# Patient Record
Sex: MALE | Race: WHITE | NOT HISPANIC OR LATINO | Employment: FULL TIME | URBAN - METROPOLITAN AREA
[De-identification: names, ages, dates, MRNs, and addresses within clinical notes are randomized per-mention and may not be internally consistent; named-entity substitution may affect disease eponyms.]

---

## 2017-10-11 DIAGNOSIS — L71.9 ACNE ROSACEA: ICD-10-CM

## 2017-10-11 DIAGNOSIS — L70.0 ACNE VULGARIS: ICD-10-CM

## 2017-10-11 RX ORDER — SULFACETAMIDE SODIUM, SULFUR 100; 50 MG/G; MG/G
LOTION TOPICAL
Qty: 3 BOTTLE | Refills: 4 | Status: SHIPPED | OUTPATIENT
Start: 2017-10-11 | End: 2018-06-29

## 2017-10-11 RX ORDER — CEPHALEXIN 500 MG/1
500 CAPSULE ORAL 2 TIMES DAILY
Qty: 180 CAPSULE | Refills: 1 | Status: SHIPPED | OUTPATIENT
Start: 2017-10-11 | End: 2018-08-03

## 2017-10-11 NOTE — TELEPHONE ENCOUNTER
Last seen 10/21/15: Future appointment scheduled for 12/7/17  1. Acne Rosacea with sebaceous hyperplasia, with a history of nodular cystic acne and scarring.  No active cysts today.   He would like to continue his current treatment.  -continue cephalexin to 500 mg 1-2 times daily. Try to use just once daily and if increased breakouts, increase.   -Continue  sulfacetamide sodium sulfur 10-5% emulsion lotion bid and a gentle cleanser.         All risks, benefits and alternatives were discussed with patient.  Patient is in agreement and understands the assessment and plan.  All questions were answered.  Sun Screen Education was given.   Return to Clinic annually or sooner as needed.   Emperatriz Hayes PA-C

## 2017-12-07 ENCOUNTER — OFFICE VISIT (OUTPATIENT)
Dept: DERMATOLOGY | Facility: CLINIC | Age: 57
End: 2017-12-07

## 2017-12-07 DIAGNOSIS — L71.9 ACNE ROSACEA: Primary | ICD-10-CM

## 2017-12-07 DIAGNOSIS — L73.8 SEBACEOUS HYPERPLASIA OF FACE: ICD-10-CM

## 2017-12-07 DIAGNOSIS — L82.1 SEBORRHEIC KERATOSIS: ICD-10-CM

## 2017-12-07 ASSESSMENT — PAIN SCALES - GENERAL: PAINLEVEL: NO PAIN (0)

## 2017-12-07 NOTE — PROGRESS NOTES
[facility]  December 7, 2017      DERMATOLOGY RETURN VISIT    Problem list:   Acne rosacea-   has used metrogel without improvement and did not like tretinoin. Dapsone gel not covered.   Previously used minocycline, doxycyline.  Hx of cystic acne, treated with Accutane.      CHIEF COMPLAINT:  Rosacea, acne.      HISTORY OF PRESENT ILLNESS:  Mr. Glass is a very pleasant, 57-year-old  male who returns for followup of his severe nodular cystic acne with scarring and persistent rosacea. He was last seen October 21, 2015. He was continued on cephalexin and   sulfacetamide sodium sulfur 10-5% emulsion lotion. He states this regiment works very well. He was running low on his lotion and started using an older prescription of Azelex. He does not feel this works as well. His skin is worse when he tapers down on the cephalexin.  He avoids using this if he is outdoors.He denies any significant breakouts and is happy with how his skin looks. He denies any skin eruptions, abdominal pain, stool changes, fevers or arthralgias. He's feeling well otherwise, no other skin complaints.      Family hx : No family hx of skin cancer.  The patient denies GI upset, bloating, diarrhea, new rashes. Denies changing, growing, painful or bleeding lesions.     EXAM:   GENERAL:    Mr. Glass is a well-groomed adult  male who presents in no acute distress.  He is alert and oriented x3.  He is pleasant and cooperative with the exam.  Dupont skin type II.     SKIN: Waist-up skin, which includes the head/face, neck, both arms, chest, back, abdomen, digits and/or nails was examined. Significant for:      There is residual post acne cystic scarring on the face, bilateral cheeks, chin and forehead, some ice pick scarring.  Multiple lesions are consistent with sebaceous hyperplasia distributed widely across the face, most prominent in mid face region.    There is mild bulbous enlargement of his nose and  mild malar thickening noted on bilateral cheeks.    There is no evidence of blepharitis, conjunctivitis or other periorbital involvement.   No cystic papules noted. Rare open comedones on the face.  There is mild residual flushing noted on bilateral cheeks as well.  The chest and back are clear of acne.  Waxy white, brown and tan verrucoid, stuck on- appearing papules and plaques to the trunk and upper extremities.    ASSESSMENT AND PLAN:    1. Acne Rosacea with sebaceous hyperplasia, with a history of nodular cystic acne and scarring.  No active cysts today.   He would like to continue his current treatment.  -continue cephalexin to 500 mg 1-2 times daily. Try to use just once daily and if increased breakouts, increase.   -Continue  sulfacetamide sodium sulfur 10-5% emulsion lotion bid and a gentle cleanser.     2. Waist up skin exam with benign findings including seborrheic keratoses. Reassurance given.      All risks, benefits and alternatives were discussed with patient.  Patient is in agreement and understands the assessment and plan.  All questions were answered.  Sun Screen Education was given.   Return to Clinic 1-2 yrs or sooner as needed.   Emperatriz Hayes PA-C

## 2017-12-07 NOTE — LETTER
12/7/2017       RE: Nathen Glass  [address]  [place] [address]     Dear Colleague,    Thank you for referring your patient, Nathen Glass, to the [facility] DERMATOLOGY at [facility]. Please see a copy of my visit note below.    [facility]  December 7, 2017      DERMATOLOGY RETURN VISIT    Problem list:   Acne rosacea-   has used metrogel without improvement and did not like tretinoin. Dapsone gel not covered.   Previously used minocycline, doxycyline.  Hx of cystic acne, treated with Accutane.      CHIEF COMPLAINT:  Rosacea, acne.      HISTORY OF PRESENT ILLNESS:  Mr. Glass is a very pleasant, 57-year-old  male who returns for followup of his severe nodular cystic acne with scarring and persistent rosacea. He was last seen October 21, 2015. He was continued on cephalexin and   sulfacetamide sodium sulfur 10-5% emulsion lotion. He states this regiment works very well. He was running low on his lotion and started using an older prescription of Azelex. He does not feel this works as well. His skin is worse when he tapers down on the cephalexin.  He avoids using this if he is outdoors.He denies any significant breakouts and is happy with how his skin looks. He denies any skin eruptions, abdominal pain, stool changes, fevers or arthralgias. He's feeling well otherwise, no other skin complaints.      Family hx : No family hx of skin cancer.  The patient denies GI upset, bloating, diarrhea, new rashes. Denies changing, growing, painful or bleeding lesions.     EXAM:   GENERAL:    Mr. Glass is a well-groomed adult  male who presents in no acute distress.  He is alert and oriented x3.  He is pleasant and cooperative with the exam.  Dupont skin type II.     SKIN: Waist-up skin, which includes the head/face, neck, both arms, chest, back, abdomen, digits and/or nails was examined. Significant for:      There is residual  post acne cystic scarring on the face, bilateral cheeks, chin and forehead, some ice pick scarring.  Multiple lesions are consistent with sebaceous hyperplasia distributed widely across the face, most prominent in mid face region.    There is mild bulbous enlargement of his nose and mild malar thickening noted on bilateral cheeks.    There is no evidence of blepharitis, conjunctivitis or other periorbital involvement.   No cystic papules noted. Rare open comedones on the face.  There is mild residual flushing noted on bilateral cheeks as well.  The chest and back are clear of acne.  Waxy white, brown and tan verrucoid, stuck on- appearing papules and plaques to the trunk and upper extremities.    ASSESSMENT AND PLAN:    1. Acne Rosacea with sebaceous hyperplasia, with a history of nodular cystic acne and scarring.  No active cysts today.   He would like to continue his current treatment.  -continue cephalexin to 500 mg 1-2 times daily. Try to use just once daily and if increased breakouts, increase.   -Continue  sulfacetamide sodium sulfur 10-5% emulsion lotion bid and a gentle cleanser.     2. Waist up skin exam with benign findings including seborrheic keratoses. Reassurance given.      All risks, benefits and alternatives were discussed with patient.  Patient is in agreement and understands the assessment and plan.  All questions were answered.  Sun Screen Education was given.   Return to Clinic 1-2 yrs or sooner as needed.   Emperatriz Hayes PA-C     Again, thank you for allowing me to participate in the care of your patient.      Sincerely,    Emperatriz Hayes PA-C

## 2017-12-07 NOTE — LETTER
Date:December 8, 2017      Patient was self referred, no letter generated. Do not send.        [facility] Health Information

## 2017-12-07 NOTE — MR AVS SNAPSHOT
After Visit Summary   2017    Nathen Glass    MRN: [id number]           Patient Information     Date Of Birth          1960        Visit Information        Provider Department      2017 7:45 AM Emperatriz Hayes PA-C [facility]        Today's Diagnoses     Acne rosacea    -  1    Seborrheic keratosis        Sebaceous hyperplasia of face           Follow-ups after your visit        Follow-up notes from your care team     Return in about 1 year (around 2018).      Who to contact     Please call your clinic at [phone] to:    Ask questions about your health    Make or cancel appointments    Discuss your medicines    Learn about your test results    Speak to your doctor   If you have compliments or concerns about an experience at your clinic, or if you wish to file a complaint, please contact [facility] Patient Relations at [phone] or email us at [email or web address]         Additional Information About Your Visit        MyChart Information     [employer] is an electronic gateway that provides easy, online access to your medical records. With [employer], you can request a clinic appointment, read your test results, renew a prescription or communicate with your care team.     To sign up for [employer] visit the website at [email or web address]   You will be asked to enter the access code listed below, as well as some personal information. Please follow the directions to create your username and password.     Your access code is: [email or web address]  Expires: 2018  6:30 AM     Your access code will  in 90 days. If you need help or a new code, please contact your [facility] or call [phone] for assistance.        Care EveryWhere ID     This is your Care EveryWhere ID. This could be used by other organizations to access your [facility] medical records  [id number]         Blood Pressure from  Last 3 Encounters:   No data found for BP    Weight from Last 3 Encounters:   No data found for Wt              Today, you had the following     No orders found for display       Primary Care Provider    None Specified       No primary provider on file.        Equal Access to Services     GONZALESLILIAN JEREMY : Haddeana aad ku hadconcepción Hansen, christiano ortiz, macho kakendal dubois, filippo gonzalez laRichardjeramie . So [facility] [phone].    ATENCIÓN: Si habla español, tiene a martines disposición servicios gratuitos de asistencia lingüística. Llame al [phone].    We comply with applicable federal civil rights laws and [place] laws. We do not discriminate on the basis of race, color, national origin, age, disability, sex, sexual orientation, or gender identity.            Thank you!     Thank you for choosing [facility] DERMAT[facility]  for your care. Our goal is always to provide you with excellent care. Hearing back from our patients is one way we can continue to improve our services. Please take a few minutes to complete the written survey that you may receive in the mail after your visit with us. Thank you!             Your Updated Medication List - Protect others around you: Learn how to safely use, store and throw away your medicines at www.disposemymeds.org.          This list is accurate as of: 12/7/17  9:16 AM.  Always use your most recent med list.                   Brand Name Dispense Instructions for use Diagnosis    cephALEXin 500 MG capsule    KEFLEX    180 capsule    Take 1 capsule (500 mg) by mouth 2 times daily    Acne vulgaris       lisinopril 40 MG tablet    PRINIVIL/ZESTRIL     Take 40 mg by mouth daily.        sulfacetamide sodium-sulfur 10-5 % Lotn     3 Bottle    Use daily 1-2 times daily to face. (3 month supply)    Acne rosacea       ZOCOR PO      Take 1 tablet by mouth daily.

## 2017-12-07 NOTE — NURSING NOTE
Dermatology Rooming Note    Nathen Glass's goals for this visit include:   Chief Complaint   Patient presents with     Derm Problem     Nathen is here for a rosacea and acne follow up.  States he's had improvement.  States no concerns.       Renata Houston LPN

## 2018-06-28 ENCOUNTER — TELEPHONE (OUTPATIENT)
Dept: DERMATOLOGY | Facility: CLINIC | Age: 58
End: 2018-06-28

## 2018-06-28 DIAGNOSIS — L71.9 ACNE ROSACEA: Primary | ICD-10-CM

## 2018-06-28 NOTE — TELEPHONE ENCOUNTER
New insurance will only cover Rx foam sulfacetamide sodium-sulfur 10-5% 90 day supply.  Can you please order foam.     Pharmacy selected.

## 2018-06-28 NOTE — TELEPHONE ENCOUNTER
[facility] Call Center    Phone Message    May a detailed message be left on voicemail: no    Reason for Call: Other: Patient changed to new insurance and pharmacy. He using [facility]. Patient can  no longer use the lotion, but can have the foam for RX sulfacetamide sodium-sulfur 10-5 % LOTN. Patient would like a call to discuss care plan.      Action Taken: Message routed to:  [facility] derm

## 2018-08-03 DIAGNOSIS — L70.0 ACNE VULGARIS: ICD-10-CM

## 2018-08-04 RX ORDER — CEPHALEXIN 500 MG/1
500 CAPSULE ORAL 2 TIMES DAILY
Qty: 180 CAPSULE | Refills: 1 | Status: SHIPPED | OUTPATIENT
Start: 2018-08-04 | End: 2019-05-22

## 2018-08-04 NOTE — TELEPHONE ENCOUNTER
cephALEXin (KEFLEX) 500 MG capsule     Last Written Prescription Date:  10/11/17  Last Fill Quantity: 180,   # refills: 1  Last Office Visit :12/7/17  Future Office visit: none    continue cephalexin to 500 mg 1-2 times daily. Try to use just once daily and if increased breakouts, increase.   Return to Clinic 1-2 yrs or sooner as needed    Refilled #180 1 rf to pharmacy

## 2019-05-22 DIAGNOSIS — L70.0 ACNE VULGARIS: ICD-10-CM

## 2019-05-28 RX ORDER — CEPHALEXIN 500 MG/1
500 CAPSULE ORAL 2 TIMES DAILY
Qty: 180 CAPSULE | Refills: 1 | Status: SHIPPED | OUTPATIENT
Start: 2019-05-28 | End: 2020-01-08

## 2019-05-28 NOTE — TELEPHONE ENCOUNTER
"  cephALEXin (KEFLEX) 500 MG capsule    Last Written Prescription Date:  8/4/18  Last Fill Quantity: 180,   # refills: 1  Last Office Visit : 12/7/17  Future Office visit:  None    Process 4    12/7/17  Freddy  \"  continue cephalexin to 500 mg 1-2 times daily. Try to use just once daily and if increased breakouts, increase.'     \"Return to Clinic 1-2 yrs or sooner as needed.         "

## 2019-06-07 DIAGNOSIS — L71.9 ACNE ROSACEA: ICD-10-CM

## 2020-01-04 DIAGNOSIS — L70.0 ACNE VULGARIS: ICD-10-CM

## 2020-01-08 RX ORDER — CEPHALEXIN 500 MG/1
500 CAPSULE ORAL 2 TIMES DAILY
Qty: 180 CAPSULE | Refills: 0 | Status: SHIPPED | OUTPATIENT
Start: 2020-01-08 | End: 2020-02-24

## 2020-01-08 NOTE — TELEPHONE ENCOUNTER
"Last Clinic Visit: 12-7-17:  \"Return to Clinic 1-2 yrs or sooner as needed.\"    Scheduling has been notified to contact the pt for appointment.    "

## 2020-01-15 ENCOUNTER — DOCUMENTATION ONLY (OUTPATIENT)
Dept: CARE COORDINATION | Facility: CLINIC | Age: 60
End: 2020-01-15

## 2020-02-24 ENCOUNTER — OFFICE VISIT (OUTPATIENT)
Dept: DERMATOLOGY | Facility: CLINIC | Age: 60
End: 2020-02-24
Payer: COMMERCIAL

## 2020-02-24 DIAGNOSIS — L71.9 ACNE ROSACEA: Primary | ICD-10-CM

## 2020-02-24 DIAGNOSIS — L21.9 DERMATITIS, SEBORRHEIC: ICD-10-CM

## 2020-02-24 DIAGNOSIS — L70.0 ACNE VULGARIS: ICD-10-CM

## 2020-02-24 RX ORDER — CEPHALEXIN 500 MG/1
500 CAPSULE ORAL 2 TIMES DAILY
Qty: 180 CAPSULE | Refills: 3 | Status: SHIPPED | OUTPATIENT
Start: 2020-02-24 | End: 2021-11-18

## 2020-02-24 RX ORDER — SULFACETAMIDE SODIUM 100 MG/ML
LOTION TOPICAL
Qty: 3 BOTTLE | Refills: 3 | Status: SHIPPED | OUTPATIENT
Start: 2020-02-24 | End: 2021-12-21

## 2020-02-24 RX ORDER — KETOCONAZOLE 20 MG/G
CREAM TOPICAL DAILY
Qty: 60 G | Refills: 3 | Status: SHIPPED | OUTPATIENT
Start: 2020-02-24 | End: 2021-12-21

## 2020-02-24 NOTE — NURSING NOTE
Chief Complaint   Patient presents with     Derm Problem     Nathen is here today for Rosacaea follow up appt.      Guerda Hammond LPN

## 2020-02-24 NOTE — PROGRESS NOTES
[facility] Dermatology Note      Dermatology Problem List:  1. Acne Rosacea  - s/p sulfacetamide sodium sulfur 10-5% emulsion lotion BID  - cephalexin to 500 mg 1-2 times daily  - sulfacetamide sodium 10% lotion BID  - Gentle cleanser daily  2. Seborrheic dermatitis  - Ketoconazole 2% cream BID    Encounter Date: Feb 24, 2020    CC:  Chief Complaint   Patient presents with     Derm Problem     Nathen is here today for Rosacaea follow up appt.          History of Present Illness:  Mr. Nathen Glass is a 59 year old male who presents as a follow-up for acne rosacea. The patient was last seen 12/7/17 when he continued use of cephalexin 500 mg 1-2 times a day, sulfacetamide sodium sulfur 10-5% emulsion lotion BID, and use of a gentle cleanser for his acne rosacea.    His skin has been stable since the last visit. He has not been using the sulfacetamide sodium sulfur lotion as his insurance discontinued coverage. He has continued Cephalexin 500 mg. Most days he takes it once a day, but for flares he uses it twice a day. He has taken it daily and has not decreased to every other day or attempted a taper.     He notes that he has several flaky lesions on the face and neck today today.     The patient denies GI upset, bloating, diarrhea, new rashes. Denies changing, growing, painful or bleeding lesions. Otherwise he is feeling well, without additional skin concerns at this time.      Past Medical History:   Patient Active Problem List   Diagnosis     Acne rosacea     No past medical history on file.  No past surgical history on file.    Social History:   reports that he has quit smoking. His smoking use included cigarettes. He has never used smokeless tobacco.    Family History:  No family hx of skin cancer.  Family History   Problem Relation Age of Onset     Cancer No family hx of         No family history of skin cancer       Medications:  Current Outpatient Medications   Medication Sig Dispense Refill      cephALEXin (KEFLEX) 500 MG capsule Take 1 capsule (500 mg) by mouth 2 times daily Call clinic to schedule follow up appointment.Needed for further rerfills 180 capsule 0     lisinopril (PRINIVIL,ZESTRIL) 40 MG tablet Take 40 mg by mouth daily.       Simvastatin (ZOCOR PO) Take 1 tablet by mouth daily.       Sulfacetamide Sodium-Sulfur 10-5 % FOAM Apply 1-2 times daily to the face. 3 each 3       Allergies   Allergen Reactions     Gemfibrozil Unknown     Unknown         Review of Systems:  -As per HPI  -Constitutional: The patient denies fatigue, fevers, chills, unintended weight loss, and night sweats.  -HEENT: Patient denies nonhealing oral sores.  -Skin: As above in HPI. No additional skin concerns.    Physical exam:  Vitals: There were no vitals taken for this visit.  GEN: This is a well developed, well-nourished male in no acute distress, in a pleasant mood.    SKIN: Focused examination of the Face, neck, and hands was performed.    - Residual post acne cystic scarring on the face, bilateral cheeks, chin and forehead, some ice pick scarring.  Multiple lesions are consistent with sebaceous hyperplasia distributed widely across the face, most prominent in mid face region.    - No evidence of blepharitis, conjunctivitis or other periorbital involvement.  - Two pustules and erythema to the left lobule  - Pink scaly plaque to the left nasolabial fold and upper neck bilaterally.  - Resolving papule on the left upper neck  - No other lesions of concern on areas examined.       Impression/Plan:  1. Acne Rosacea   - Discussed initiating another lotion that may be covered by his insurance as he has been unable to use the sulfur based lotion.  - Start sulfacetamide sodium 10% lotion, apply BID to the face  - Continue Cephalexin 500 mg 1-2 times a day, taking BID for flares or worsening symptoms    2. Seborrheic dermatitis, left nasolabial fold and upper neck bilaterally  - Start Ketoconazole 2% cream BID to flaking on  the face and behind the ears      Follow-up in 1 year, earlier for new or changing lesions.       Staff Involved:  Staff/Scribe    Scribe Disclosure:  I, Steve Alvarado am serving as a scribe to document services personally performed by Emperatriz Hayes PA-C based on data collection and the provider's statements to me.     Provider Disclosure:   The documentation recorded by the scribe accurately reflects the services I personally performed and the decisions made by me.    All risks, benefits and alternatives were discussed with patient.  Patient is in agreement and understands the assessment and plan.  All questions were answered.  Sun Screen Education was given.   Return to Clinic annually or sooner as needed.   Emperatriz Hayes PA-C   [facility]

## 2020-02-24 NOTE — LETTER
Date:February 25, 2020      Patient was self referred, no letter generated. Do not send.        [facility] Health Information

## 2020-02-24 NOTE — LETTER
2/24/2020       RE: Nathen Glass  [address]  [place] [address]     Dear Colleague,    Thank you for referring your patient, Nathen Glass, to the [facility] DERMATOLOGY at [facility]. Please see a copy of my visit note below.    [facility] Dermatology Note      Dermatology Problem List:  1. Acne Rosacea  - s/p sulfacetamide sodium sulfur 10-5% emulsion lotion BID  - cephalexin to 500 mg 1-2 times daily  - sulfacetamide sodium 10% lotion BID  - Gentle cleanser daily  2. Seborrheic dermatitis  - Ketoconazole 2% cream BID    Encounter Date: Feb 24, 2020    CC:  Chief Complaint   Patient presents with     Derm Problem     Nathen is here today for Rosacaea follow up appt.          History of Present Illness:  Mr. Nathen Glass is a 59 year old male who presents as a follow-up for acne rosacea. The patient was last seen 12/7/17 when he continued use of cephalexin 500 mg 1-2 times a day, sulfacetamide sodium sulfur 10-5% emulsion lotion BID, and use of a gentle cleanser for his acne rosacea.    His skin has been stable since the last visit. He has not been using the sulfacetamide sodium sulfur lotion as his insurance discontinued coverage. He has continued Cephalexin 500 mg. Most days he takes it once a day, but for flares he uses it twice a day. He has taken it daily and has not decreased to every other day or attempted a taper.     He notes that he has several flaky lesions on the face and neck today today.     The patient denies GI upset, bloating, diarrhea, new rashes. Denies changing, growing, painful or bleeding lesions.  Otherwise he is feeling well, without additional skin concerns at this time.      Past Medical History:   Patient Active Problem List   Diagnosis     Acne rosacea     No past medical history on file.  No past surgical history on file.    Social History:   reports that he has quit smoking. His smoking use included cigarettes.  He has never used smokeless tobacco.    Family History:  No family hx of skin cancer.  Family History   Problem Relation Age of Onset     Cancer No family hx of         No family history of skin cancer       Medications:  Current Outpatient Medications   Medication Sig Dispense Refill     cephALEXin (KEFLEX) 500 MG capsule Take 1 capsule (500 mg) by mouth 2 times daily Call clinic to schedule follow up appointment.Needed for further rerfills 180 capsule 0     lisinopril (PRINIVIL,ZESTRIL) 40 MG tablet Take 40 mg by mouth daily.       Simvastatin (ZOCOR PO) Take 1 tablet by mouth daily.       Sulfacetamide Sodium-Sulfur 10-5 % FOAM Apply 1-2 times daily to the face. 3 each 3       Allergies   Allergen Reactions     Gemfibrozil Unknown     Unknown         Review of Systems:  -As per HPI  -Constitutional: The patient denies fatigue, fevers, chills, unintended weight loss, and night sweats.  -HEENT: Patient denies nonhealing oral sores.  -Skin: As above in HPI. No additional skin concerns.    Physical exam:  Vitals: There were no vitals taken for this visit.  GEN: This is a well developed, well-nourished male in no acute distress, in a pleasant mood.    SKIN: Focused examination of the Face, neck, and hands was performed.    - Residual post acne cystic scarring on the face, bilateral cheeks, chin and forehead, some ice pick scarring.  Multiple lesions are consistent with sebaceous hyperplasia distributed widely across the face, most prominent in mid face region.    - No evidence of blepharitis, conjunctivitis or other periorbital involvement.  - Two pustules and erythema to the left lobule  - Pink scaly plaque to the left nasolabial fold and upper neck bilaterally.  - Resolving papule on the left upper neck  - No other lesions of concern on areas examined.       Impression/Plan:  1. Acne Rosacea   - Discussed initiating another lotion that may be covered by his insurance as he has been unable to use the sulfur based  lotion.  - Start sulfacetamide sodium 10% lotion, apply BID to the face  - Continue Cephalexin 500 mg 1-2 times a day, taking BID for flares or worsening symptoms    2. Seborrheic dermatitis, left nasolabial fold and upper neck bilaterally  - Start Ketoconazole 2% cream BID to flaking on the face and behind the ears      Follow-up in 1 year, earlier for new or changing lesions.       Staff Involved:  Staff/Scribe    Scribe Disclosure:  ISteve am serving as a scribe to document services personally performed by Emperatriz Hayes PA-C based on data collection and the provider's statements to me.     Provider Disclosure:   The documentation recorded by the scribe accurately reflects the services I personally performed and the decisions made by me.    All risks, benefits and alternatives were discussed with patient.  Patient is in agreement and understands the assessment and plan.  All questions were answered.  Sun Screen Education was given.   Return to Clinic annually or sooner as needed.   Emperatriz Hayes PA-C   [facility]         Again, thank you for allowing me to participate in the care of your patient.      Sincerely,    Emperatriz Hayes PA-C

## 2020-11-14 ENCOUNTER — HEALTH MAINTENANCE LETTER (OUTPATIENT)
Age: 60
End: 2020-11-14

## 2021-09-12 ENCOUNTER — HEALTH MAINTENANCE LETTER (OUTPATIENT)
Age: 61
End: 2021-09-12

## 2021-11-12 DIAGNOSIS — L70.0 ACNE VULGARIS: ICD-10-CM

## 2021-11-14 RX ORDER — CEPHALEXIN 500 MG/1
CAPSULE ORAL
Qty: 360 CAPSULE | Refills: 0 | OUTPATIENT
Start: 2021-11-14

## 2021-11-14 NOTE — TELEPHONE ENCOUNTER
"     cephALEXin (KEFLEX) 500 MG capsule  Last Written Prescription Date: 2/24/20  Last Fill Quantity: 180,   # refills: 3  Last Office Visit : 2/24/20  Future Office visit:  None    Process 4    \" Follow-up in 1 year\"          "

## 2021-11-17 DIAGNOSIS — L70.0 ACNE VULGARIS: ICD-10-CM

## 2021-11-18 RX ORDER — CEPHALEXIN 500 MG/1
500 CAPSULE ORAL 2 TIMES DAILY
Qty: 60 CAPSULE | Refills: 2 | Status: SHIPPED | OUTPATIENT
Start: 2021-11-18 | End: 2022-02-07

## 2021-11-18 NOTE — TELEPHONE ENCOUNTER
[facility] Call Center    Phone Message    May a detailed message be left on voicemail: yes     Reason for Call: Medication Refill Request    Has the patient contacted the pharmacy for the refill? Yes   Name of medication being requested: cephALEXin (KEFLEX) 500 MG capsule  Provider who prescribed the medication: Emperatriz Hayes PA-C  Pharmacy: [facility] - [facility] [address] [place] [address], [address]  Date medication is needed: ASAP   Pt scheduled the soonest Appt available 02/07 and wait listed. Please call Pt to discuss. Thanks      Action Taken: Message routed to:  [facility] Derm    Travel Screening: Not Applicable

## 2021-11-18 NOTE — TELEPHONE ENCOUNTER
Cephalexin Oral Capsule 500 MG      Last Written Prescription Date:  2/24/20  Last Fill Quantity: 180,   # refills: 3  Last Office Visit : 2/24/20 recommended 1 year follow up  Future Office visit:  2/7/22    Routing refill request to provider for review/approval because:  Drug not on derm refill protocol  Has been >18 month since last visit  How taking?  Gap in therapy?

## 2021-11-18 NOTE — TELEPHONE ENCOUNTER
Cephalexin Oral Capsule 500 MG  Last Written Prescription Date:  2/24/2020  Last Fill Quantity: 180,   # refills: 3  Last Office Visit : 2/24/2020  Future Office visit:  None    Routing refill request to provider for review/approval because:  Second Request  Over due office visit  Refer to clinic/provider for review       Kenya Parisi RN  Central Triage Red Flags/Med Refills

## 2021-12-21 ENCOUNTER — OFFICE VISIT (OUTPATIENT)
Dept: URGENT CARE | Facility: URGENT CARE | Age: 61
End: 2021-12-21
Payer: COMMERCIAL

## 2021-12-21 VITALS — SYSTOLIC BLOOD PRESSURE: 148 MMHG | HEART RATE: 69 BPM | TEMPERATURE: 97.2 F | DIASTOLIC BLOOD PRESSURE: 76 MMHG

## 2021-12-21 DIAGNOSIS — T88.7XXA MEDICATION SIDE EFFECTS: ICD-10-CM

## 2021-12-21 DIAGNOSIS — T78.3XXA ANGIOEDEMA OF LIPS, INITIAL ENCOUNTER: Primary | ICD-10-CM

## 2021-12-21 PROCEDURE — 99203 OFFICE O/P NEW LOW 30 MIN: CPT | Mod: 25 | Performed by: PHYSICIAN ASSISTANT

## 2021-12-21 PROCEDURE — 96372 THER/PROPH/DIAG INJ SC/IM: CPT | Performed by: PHYSICIAN ASSISTANT

## 2021-12-21 RX ORDER — METHYLPREDNISOLONE SOD SUCC 125 MG
125 VIAL (EA) INJECTION ONCE
Status: COMPLETED | OUTPATIENT
Start: 2021-12-21 | End: 2021-12-21

## 2021-12-21 RX ORDER — LATANOPROST 50 UG/ML
SOLUTION/ DROPS OPHTHALMIC
COMMUNITY
Start: 2021-12-19

## 2021-12-21 RX ORDER — SERTRALINE HYDROCHLORIDE 100 MG/1
TABLET, FILM COATED ORAL
COMMUNITY
Start: 2021-11-05

## 2021-12-21 RX ORDER — METHYLPREDNISOLONE 4 MG
TABLET, DOSE PACK ORAL
Qty: 21 TABLET | Refills: 0 | Status: SHIPPED | OUTPATIENT
Start: 2021-12-21 | End: 2021-12-21

## 2021-12-21 RX ORDER — CETIRIZINE HYDROCHLORIDE 10 MG/1
10 TABLET ORAL DAILY
Qty: 30 TABLET | Refills: 0 | Status: SHIPPED | OUTPATIENT
Start: 2021-12-21

## 2021-12-21 RX ADMIN — Medication 125 MG: at 18:17

## 2021-12-22 NOTE — PATIENT INSTRUCTIONS
Patient Education     Angioedema  Angioedema (KM-kxy-sm-eh-KIARA-muh) is a sudden appearance of swollen patches (edema) on the skin or mucous membranes. It most often involves the face, lips, mouth, tongue, back of throat, or vocal cords. It may also occur in other places, such as the arms, legs, or genitals. A rash may also appear during the first 4 days of this illness.  There are different types of angioedema. Sometimes angioedema is part of an allergic reaction (allergic angioedema). Other times angioedema is present without any other signs of allergic reaction (isolated angioedema). Your symptoms will depend on what type of angioedema you have. Like allergic reactions, angioedema may include:    Rash, hives, redness, welts, blisters    Itching, burning, stinging, pain    Dry, flaky, cracking, or scaly skin    Swelling of the face, lips, tongue, or other parts of the body  More severe symptoms may include:    Trouble swallowing, or feeling like your throat is closing    Trouble breathing or wheezing    Hoarse voice or trouble speaking    Nausea, vomiting, diarrhea, or stomach cramps    Feeling faint or lightheaded, rapid heart rate, or low blood pressure  Angioedema can be triggered by exposure to certain substances. Medical conditions involving the immune systems and certain infections may cause it. In rare cases, angioedema can be hereditary. Sometimes the cause may be very clear. However, it's often hard to find a cause. The most common causes of allergic angioedema include:    Foods, such as shrimp, shellfish, peanuts, milk products, gluten, and eggs; also colorings, flavorings, and additives    Insect bites or stings, from bees, mosquitoes, fleas, or ticks    Medicines, such as ACE inhibitors, penicillin medicines, sulfa medicines, , aspirin, and ibuprofen    Latex, which may be in gloves, clothes, toys, balloons, and some kinds of tape. People who are allergic to latex may have problems with foods such as  bananas, avocados, kiwi, papaya, or chestnuts.    Stress    Heat, cold, or sunlight  The most common cause of angioedema is a reaction to a class of medicines called ACE inhibitors. These are used to treat high blood pressure. ACE inhibitors include lotensin, captopril, enalapril, and lisinopril. Angiodema can happen even after you have been taking the medicine for some time. Tell your healthcare provider if you have angioedema symptoms and are taking any of these medicines. Angioedema may recur. It's important to watch for the earliest signs of this condition (see the list below). Contact your healthcare provider right away if swelling involves the face, mouth, or throat.  Home care  Rest quietly today. Don't do vigorous physical activity.  Medicines. The healthcare provider may prescribe medicines for itching, swelling, or pain. Follow the healthcare provider s instructions when taking these medicines.    Oral diphenhydramine is an antihistamine available without a prescription. Unless a prescription antihistamine was given, diphenhydramine may be used to reduce widespread itching. It may make you sleepy, so be careful using it when going to school, working, or driving. (Note: Don't use diphenhydramine if you have glaucoma or if you are a man who has trouble urinating due to an enlarged prostate.) Loratadine is an antihistamine that may cause less drowsiness.    Don't use diphenhydramine cream on your skin. Some people can have an allergic reaction to this.    Calamine lotion or oatmeal baths sometimes help with itching.    You may use acetaminophen or ibuprofen for pain, unless another pain medicine was prescribed.    If you were told that your angioedema was caused by a medicine you are taking, you must stop taking it. Ask your healthcare provider for a different one. In the future, advise medical staff that you are allergic to this medicine.    If medicine was prescribed, such as steroids or antihistamines, be  sure you understand what the medicine is and how to take it.   General care    Make sure you don't scratch areas of the body that had a reaction. This will help prevent infection.     Stay away from air pollution, tobacco, and wood smoke. Also stay away from cold temperatures. These things can make allergy symptoms worse.    Try to find out what caused your reaction. Make sure to remove the allergen. Future reactions may be worse.     If you have a serious allergy, wear a medical alert bracelet that notes this allergy.    If the healthcare provider prescribed an epinephrine auto injector kit, keep it with you at all times.     Tell all care providers about your allergy. Ask them how to use any prescribed medicines.    Keep a record of allergies and symptoms, and when they occurred. This will help your provider treat you over time.     Follow-up care  Follow up with your healthcare provider, or as advised. You may need to see an allergist. An allergist can help find the cause of an allergic reaction and give recommendations on how to prevent future reactions.  Call 911  Call 911 right away if any of these occur:    Trouble breathing or swallowing, or wheezing    Hoarse voice or trouble speaking, or drooling    Chest pain or tightness    Confusion, lightheadedness, or dizziness    Extreme drowsiness or trouble awakening    Fainting or loss of consciousness    Rapid heart rate    Vomiting blood, or large amounts of blood in stool    Seizure    Nausea, vomiting, diarrhea, abdominal pain, or stomach cramps  When to seek medical attention  Call your healthcare provider right away if any of the following occur:    Symptoms don't go away    Symptoms come back    Symptoms get worse or new symptoms develop    Hives feel uncomfortable    Fever of 100.4 F (38 C), or as directed by your healthcare provider  [employer] last reviewed this educational content on 8/1/2019 2000-2021 [employer]. All rights reserved.  This information is not intended as a substitute for professional medical care. Always follow your healthcare professional's instructions.

## 2021-12-22 NOTE — PROGRESS NOTES
Assessment & Plan     Angioedema of lips, initial encounter  Zyrtec for angioedema  Solumedrol 125 mg IM for angioedema  - cetirizine (ZYRTEC) 10 MG tablet; Take 1 tablet (10 mg) by mouth daily  - methylPREDNISolone sodium succinate (solu-MEDROL) injection 125 mg    Medication side effects  Concern for lisinopril to be the cause of this  Discussed with patient  At this time we will stay on lisinopril and monitor symptoms  If this returns or persists then we will have to switch lisinopril      Review of external notes as documented elsewhere in note  30 minutes spent on the date of the encounter doing chart review, review of outside records, review of test results, interpretation of tests, patient visit and documentation       Return in about 1 week (around 12/28/2021).    Aj Echavarria PA-C  [facility]DA Sena is a 61 year old who presents for the following health issues     HPI     Lip swelling  Occurred suddenly  Unsure of the cause    Review of Systems   Constitutional, HEENT, cardiovascular, pulmonary, gi and gu systems are negative, except as otherwise noted.      Objective    BP (!) 148/76   Pulse 69   Temp 97.2  F (36.2  C) (Tympanic)   There is no height or weight on file to calculate BMI.  Physical Exam   GENERAL: healthy, alert and no distress  HENT: Positive for swollen upper lip right side  RESP: lungs clear to auscultation - no rales, rhonchi or wheezes  CV: regular rate and rhythm, normal S1 S2, no S3 or S4, no murmur, click or rub, no peripheral edema and peripheral pulses strong  SKIN: Positive for right upper lip edema

## 2022-01-02 ENCOUNTER — HEALTH MAINTENANCE LETTER (OUTPATIENT)
Age: 62
End: 2022-01-02

## 2022-02-07 ENCOUNTER — OFFICE VISIT (OUTPATIENT)
Dept: DERMATOLOGY | Facility: CLINIC | Age: 62
End: 2022-02-07
Payer: COMMERCIAL

## 2022-02-07 DIAGNOSIS — L71.9 ACNE ROSACEA: Primary | ICD-10-CM

## 2022-02-07 DIAGNOSIS — L70.0 ACNE VULGARIS: ICD-10-CM

## 2022-02-07 PROCEDURE — 99213 OFFICE O/P EST LOW 20 MIN: CPT | Performed by: PHYSICIAN ASSISTANT

## 2022-02-07 RX ORDER — CEPHALEXIN 500 MG/1
500 CAPSULE ORAL 2 TIMES DAILY
Qty: 180 CAPSULE | Refills: 3 | Status: SHIPPED | OUTPATIENT
Start: 2022-02-07 | End: 2023-03-06

## 2022-02-07 ASSESSMENT — PAIN SCALES - GENERAL: PAINLEVEL: NO PAIN (0)

## 2022-02-07 NOTE — LETTER
2/7/2022       RE: Nathen Glass  [address]  [facility] [address]     Dear Colleague,    Thank you for referring your patient, Nathen Glass, to the [facility] [place] [facility]. Please see a copy of my visit note below.    [facility] Dermatology Note  Encounter Date: Feb 7, 2022  Office Visit     Dermatology Problem List:  1. Acne Rosacea  - s/p sulfacetamide sodium sulfur 10-5% emulsion lotion BID  - cephalexin to 500 mg 1-2 times daily  - sulfacetamide sodium 10% lotion BID  - Skin care w/ gentle cleanser daily  2. Seborrheic dermatitis  ____________________________________________    Assessment & Plan:    1. Acne Rosacea   - Discussed initiating another lotion that may be covered by his insurance as he has been unable to use the sulfur based lotion. Pt defers topicals  -Continue antibiotic soap.   - Continue Cephalexin 500 mg 1-2 times a day, taking BID for flares or worsening symptoms  -Recommend f/u if flaring     2. Hx of Seborrheic dermatitis, not currently active.   - Advised a skin care routine involving moisturizer and sunscreen.  -Recommend f/u if flaring    Procedures Performed:   None    Follow-up: 1 year(s) in-person, or earlier for new or changing lesions    Staff and Scribe:     Provider Disclosure:   The documentation recorded by the scribe accurately reflects the services I personally performed and the decisions made by me.    All risks, benefits and alternatives were discussed with patient.  Patient is in agreement and understands the assessment and plan.  All questions were answered.  Sun Screen Education was given.   Return to Clinic  annually or sooner as needed.   Emperatriz Hayes PA-C   [facility]     Scribe Disclosure:  Leidy AKINS, am serving as a scribe to document services personally performed by Emperatriz Hayes PA-C based on  data collection and the provider's statements to me.   ____________________________________________    CC: Rosacea (follow up/ med refills)    HPI:  Mr. Nathen Glass is a(n) 61 year old male who presents today as a return patient for follow up. The patient was last seen in dermatology by myself on 02/24/2020 at which point the patient started on sulfacetamide sodium 10% lotion BID to the face and continue on Cephalexin 500 mg 1-2 times a day. Additionally, the patient started on Ketoconazole 2% cream BID to flaking on the face and behind the ears.     Today, the patient reports that his rosacea has intermittently resolved and stable. No GI upset from Cephalexin. He is not using Ketoconazole 2% for the flakiness of his skin or sulfacetamide sodium lotio. He washes his skin daily with dial soap. No spots or lesions of concern today.    Patient is otherwise feeling well, without additional skin concerns.    Labs Reviewed:  N/A    Physical Exam:  Vitals: There were no vitals taken for this visit.  SKIN: Focused examination of head was performed.  - Mild erythema to the central face without papules or pustules  - Acne scarring to the nose  - No flaking noted to the scalp, nasal-labial folds, or eyebrows.  - No other lesions of concern on areas examined.     Medications:  Current Outpatient Medications   Medication     cephALEXin (KEFLEX) 500 MG capsule     cetirizine (ZYRTEC) 10 MG tablet     latanoprost (XALATAN) 0.005 % ophthalmic solution     lisinopril (PRINIVIL,ZESTRIL) 40 MG tablet     sertraline (ZOLOFT) 100 MG tablet     Simvastatin (ZOCOR PO)     No current facility-administered medications for this visit.      Past Medical History:   Patient Active Problem List   Diagnosis     Acne rosacea     No past medical history on file.     CC Referred Self, MD  No address on file on close of this encounter.

## 2022-02-07 NOTE — PROGRESS NOTES
[facility] Dermatology Note  Encounter Date: Feb 7, 2022  Office Visit     Dermatology Problem List:  1. Acne Rosacea  - s/p sulfacetamide sodium sulfur 10-5% emulsion lotion BID  - cephalexin to 500 mg 1-2 times daily  - sulfacetamide sodium 10% lotion BID  - Skin care w/ gentle cleanser daily  2. Seborrheic dermatitis  ____________________________________________    Assessment & Plan:    1. Acne Rosacea   - Discussed initiating another lotion that may be covered by his insurance as he has been unable to use the sulfur based lotion. Pt defers topicals  -Continue antibiotic soap.   - Continue Cephalexin 500 mg 1-2 times a day, taking BID for flares or worsening symptoms  -Recommend f/u if flaring     2. Hx of Seborrheic dermatitis, not currently active.   - Advised a skin care routine involving moisturizer and sunscreen.  -Recommend f/u if flaring    Procedures Performed:   None    Follow-up: 1 year(s) in-person, or earlier for new or changing lesions    Staff and Scribe:     Provider Disclosure:   The documentation recorded by the scribe accurately reflects the services I personally performed and the decisions made by me.    All risks, benefits and alternatives were discussed with patient.  Patient is in agreement and understands the assessment and plan.  All questions were answered.  Sun Screen Education was given.   Return to Clinic  annually or sooner as needed.   Emperatriz Hayes PA-C   [facility]     Scribe Disclosure:  I, Leidy Rodriguez, am serving as a scribe to document services personally performed by Emperatriz Hayes PA-C based on data collection and the provider's statements to me.   ____________________________________________    CC: Rosacea (follow up/ med refills)    HPI:  Mr. Nathen Glass is a(n) 61 year old male who presents today as a return patient for follow up. The patient was last seen in dermatology by myself on 02/24/2020 at which  point the patient started on sulfacetamide sodium 10% lotion BID to the face and continue on Cephalexin 500 mg 1-2 times a day. Additionally, the patient started on Ketoconazole 2% cream BID to flaking on the face and behind the ears.     Today, the patient reports that his rosacea has intermittently resolved and stable. No GI upset from Cephalexin. He is not using Ketoconazole 2% for the flakiness of his skin or sulfacetamide sodium lotio. He washes his skin daily with dial soap. No spots or lesions of concern today.    Patient is otherwise feeling well, without additional skin concerns.    Labs Reviewed:  N/A    Physical Exam:  Vitals: There were no vitals taken for this visit.  SKIN: Focused examination of head was performed.  - Mild erythema to the central face without papules or pustules  - Acne scarring to the nose  - No flaking noted to the scalp, nasal-labial folds, or eyebrows.  - No other lesions of concern on areas examined.     Medications:  Current Outpatient Medications   Medication     cephALEXin (KEFLEX) 500 MG capsule     cetirizine (ZYRTEC) 10 MG tablet     latanoprost (XALATAN) 0.005 % ophthalmic solution     lisinopril (PRINIVIL,ZESTRIL) 40 MG tablet     sertraline (ZOLOFT) 100 MG tablet     Simvastatin (ZOCOR PO)     No current facility-administered medications for this visit.      Past Medical History:   Patient Active Problem List   Diagnosis     Acne rosacea     No past medical history on file.     CC Referred Self, MD  No address on file on close of this encounter.

## 2022-11-19 ENCOUNTER — HEALTH MAINTENANCE LETTER (OUTPATIENT)
Age: 62
End: 2022-11-19

## 2022-12-18 ENCOUNTER — HEALTH MAINTENANCE LETTER (OUTPATIENT)
Age: 62
End: 2022-12-18

## 2023-03-02 DIAGNOSIS — L70.0 ACNE VULGARIS: ICD-10-CM

## 2023-03-06 RX ORDER — CEPHALEXIN 500 MG/1
500 CAPSULE ORAL 2 TIMES DAILY
Qty: 180 CAPSULE | Refills: 1 | Status: SHIPPED | OUTPATIENT
Start: 2023-03-06 | End: 2024-08-27

## 2023-03-06 NOTE — TELEPHONE ENCOUNTER
Cephalexin Oral Capsule 500 MG  Last Written Prescription Date:   2/7/2022  Last Fill Quantity: 180,   # refills: 3  Last Office Visit :  2/7/2022  Future Office visit:  None    Routing refill request to provider for review/approval because:  Drug not on the [facility] or [facility] refill protocol or controlled substance      Kenya Parisi RN  Central Triage Red Flags/Med Refills

## 2023-03-16 ENCOUNTER — TELEPHONE (OUTPATIENT)
Dept: DERMATOLOGY | Facility: CLINIC | Age: 63
End: 2023-03-16
Payer: COMMERCIAL

## 2023-03-16 NOTE — TELEPHONE ENCOUNTER
Nitom. informing patient to call [phone] to schedule a Med refill request follow up with Emperatriz Hayes  in Dermatology.

## 2023-06-20 ENCOUNTER — OFFICE VISIT (OUTPATIENT)
Dept: FAMILY MEDICINE | Facility: CLINIC | Age: 63
End: 2023-06-20
Payer: COMMERCIAL

## 2023-06-20 DIAGNOSIS — L71.9 ACNE ROSACEA: Primary | ICD-10-CM

## 2023-06-20 PROCEDURE — 99214 OFFICE O/P EST MOD 30 MIN: CPT | Performed by: PHYSICIAN ASSISTANT

## 2023-06-20 RX ORDER — DOXYCYCLINE 100 MG/1
100 CAPSULE ORAL 2 TIMES DAILY
Qty: 180 CAPSULE | Refills: 3 | Status: SHIPPED | OUTPATIENT
Start: 2023-06-20 | End: 2024-06-24

## 2023-06-20 RX ORDER — TRETINOIN 0.25 MG/G
CREAM TOPICAL
Qty: 45 G | Refills: 3 | Status: SHIPPED | OUTPATIENT
Start: 2023-06-20 | End: 2024-08-27

## 2023-06-20 ASSESSMENT — PAIN SCALES - GENERAL: PAINLEVEL: NO PAIN (0)

## 2023-06-20 NOTE — PATIENT INSTRUCTIONS
Topical Retinoids    What are topical retinoids?  These are medicines that are related to Vitamin A. They are used on the skin.  Retin-A , Renova , Differin , and Tazorac  are brand names.  Come in creams and clear gels  Used to treat skin conditions like pimples (acne), face wrinkling, or dark-colored sunspots    How do I use these medicines?  Wash face and let dry for 15 to 30 minutes.  Use a large pea-size amount of medicine to cover the whole face. Do not put on close to the eyes and lips.  Rub in gently.   Start by using every other day.  If you have no irritation after a few days, start to use it daily.    You might have too much irritation with daily use. Use it less often until the irritation goes away.  Then try to increase slowly to daily use.   Irritation improves over time.  You may use moisturizer if your skin becomes dry. Look for  non-comedogenic  (non-pore plugging) and oil free products.      What are the side effects?  Dryness   Peeling and flaking   Irritation of the skin   Possible increased chance of sunburns.  Protect your skin from sunlight. Wear a hat and use a sunscreen with SPF 30 or higher. Your sunscreen should have both UVA and UVB (broad-spectrum) protection.

## 2023-06-20 NOTE — PROGRESS NOTES
[facility] Dermatology Note  Encounter Date: Jun 20, 2023  Office Visit     Dermatology Problem List:  1. Acne Rosacea- doxycyline 100 mg bid  Tretinoin 0.025% cream  - s/p sulfacetamide sodium sulfur 10-5% emulsion lotion BID  - s/p cephalexin to 500 mg 1-2 times daily  - sulfacetamide sodium 10% lotion BID  - Skin care w/ gentle cleanser daily  2. Seborrheic dermatitis  ____________________________________________    Assessment & Plan:    1. Acne Rosacea, chronic and flaring.   -Stop Cephalex. Start Start doxycyline 100 mg po bid. Discussed possible GI upset and sun sensitivity. Reduce to once daily once under control.  -Continue antibiotic soap.   Start tretinoin 0.025% cream to face. Instructed to apply topical acne medication once every other day and increase to nightly as tolerate.  Waiting 20-30 minutes after washing affected area(s) will decrease irritation. Method of application, side effects and expected results were discussed. The patient will apply pea size amount to the entire face, avoid areas around the eyes, corners of nose and mouth. Discussed side effects including photosensitivity and irritation.  Appropriate handout provided.  -Recommend f/u if flaring     2. Hx of seb derm,  Not active. Will monitor.     Procedures Performed:   None    Follow-up: 1 year(s) in-person, or earlier for new or changing lesions/ rosacea flaring.     Staff:    All risks, benefits and alternatives were discussed with patient.  Patient is in agreement and understands the assessment and plan.  All questions were answered.  Sun Screen Education was given.   Return to Clinic  annually or sooner as needed.   Emperatriz Hayes PA-C   [facility]     ____________________________________________    CC: Acne    HPI:  Mr. Nathen Glass is a(n) 62 year old male who presents today as a return patient for follow up. The patient was last seen in dermatology by myself on 02/7/2022 at  which point the patient was continued on  Cephalexin 500 mg 2 times a day without control of his skin. He has had deeper painful pimples on a regular basis. He would like to consider changing antibiotics. He has never had any GI issues or side effects from any of the antibiotics he has been on.    Patient is otherwise feeling well, without additional skin concerns.    Labs Reviewed:  N/A    Physical Exam:  Vitals: There were no vitals taken for this visit.  SKIN: Focused examination of face  was performed. Significant for:   - Erythema to the central face with a few tender papules on the media cheeks and nose.   - Acne scarring to the nose  - No flaking noted to the scalp, nasal-labial folds, or eyebrows.  - No other lesions of concern on areas examined.             Medications:  Current Outpatient Medications   Medication     cephALEXin (KEFLEX) 500 MG capsule     latanoprost (XALATAN) 0.005 % ophthalmic solution     lisinopril (PRINIVIL,ZESTRIL) 40 MG tablet     sertraline (ZOLOFT) 100 MG tablet     Simvastatin (ZOCOR PO)     cetirizine (ZYRTEC) 10 MG tablet     No current facility-administered medications for this visit.      Past Medical History:   Patient Active Problem List   Diagnosis     Acne rosacea     History reviewed. No pertinent past medical history.     CC Referred Self, MD  No address on file on close of this encounter.

## 2023-06-20 NOTE — LETTER
6/20/2023         RE: Nathen Glass  [address]  [facility] [address]        Dear Colleague,    Thank you for referring your patient, Nathen Glass, to the [facility] [place]. Please see a copy of my visit note below.    [facility] Note  Encounter Date: Jun 20, 2023  Office Visit     Dermatology Problem List:  1. Acne Rosacea- doxycyline 100 mg bid  Tretinoin 0.025% cream  - s/p sulfacetamide sodium sulfur 10-5% emulsion lotion BID  - s/p cephalexin to 500 mg 1-2 times daily  - sulfacetamide sodium 10% lotion BID  - Skin care w/ gentle cleanser daily  2. Seborrheic dermatitis  ____________________________________________    Assessment & Plan:    1. Acne Rosacea, chronic and flaring.   -Stop Cephalex. Start Start doxycyline 100 mg po bid. Discussed possible GI upset and sun sensitivity. Reduce to once daily once under control.  -Continue antibiotic soap.   Start tretinoin 0.025% cream to face. Instructed to apply topical acne medication once every other day and increase to nightly as tolerate.  Waiting 20-30 minutes after washing affected area(s) will decrease irritation. Method of application, side effects and expected results were discussed. The patient will apply pea size amount to the entire face, avoid areas around the eyes, corners of nose and mouth. Discussed side effects including photosensitivity and irritation.  Appropriate handout provided.  -Recommend f/u if flaring     2. Hx of seb derm,  Not active. Will monitor.     Procedures Performed:   None    Follow-up: 1 year(s) in-person, or earlier for new or changing lesions/ rosacea flaring.     Staff:    All risks, benefits and alternatives were discussed with patient.  Patient is in agreement and understands the assessment and plan.  All questions were answered.  Sun Screen Education was given.   Return to Clinic  annually or sooner as needed.   Emperatriz Hayes PA-C   [facility]  [facility]     ____________________________________________    CC: Acne    HPI:  Mr. Nathen Glass is a(n) 62 year old male who presents today as a return patient for follow up. The patient was last seen in dermatology by myself on 02/7/2022 at which point the patient was continued on  Cephalexin 500 mg 2 times a day without control of his skin. He has had deeper painful pimples on a regular basis. He would like to consider changing antibiotics. He has never had any GI issues or side effects from any of the antibiotics he has been on.    Patient is otherwise feeling well, without additional skin concerns.    Labs Reviewed:  N/A    Physical Exam:  Vitals: There were no vitals taken for this visit.  SKIN: Focused examination of face  was performed. Significant for:   - Erythema to the central face with a few tender papules on the media cheeks and nose.   - Acne scarring to the nose  - No flaking noted to the scalp, nasal-labial folds, or eyebrows.  - No other lesions of concern on areas examined.             Medications:  Current Outpatient Medications   Medication     cephALEXin (KEFLEX) 500 MG capsule     latanoprost (XALATAN) 0.005 % ophthalmic solution     lisinopril (PRINIVIL,ZESTRIL) 40 MG tablet     sertraline (ZOLOFT) 100 MG tablet     Simvastatin (ZOCOR PO)     cetirizine (ZYRTEC) 10 MG tablet     No current facility-administered medications for this visit.      Past Medical History:   Patient Active Problem List   Diagnosis     Acne rosacea     History reviewed. No pertinent past medical history.     CC Referred Self, MD  No address on file on close of this encounter.      Again, thank you for allowing me to participate in the care of your patient.        Sincerely,        Emperatriz Hayes PA-C

## 2023-06-21 ENCOUNTER — TELEPHONE (OUTPATIENT)
Dept: FAMILY MEDICINE | Facility: CLINIC | Age: 63
End: 2023-06-21
Payer: COMMERCIAL

## 2023-06-21 NOTE — TELEPHONE ENCOUNTER
Prior Authorization Retail Medication Request    Medication/Dose: Tretinoin 0.025% cream  ICD code (if different than what is on RX):    Previously Tried and Failed:    Rationale:      Insurance Name:    Insurance ID:        Pharmacy Information (if different than what is on RX)  Name:  [facility]  Phone:  [phone]

## 2023-06-27 NOTE — TELEPHONE ENCOUNTER
Central Prior Authorization Team   Phone: [phone]    PA Initiation    Medication: Tretinoin 0.025% cream  Insurance Company: [employer] - Phone [phone] Fax [phone]  Pharmacy Filling the Rx: [id number] - [place], [place] - [address] [facility]  Filling Pharmacy Phone: [phone]  Filling Pharmacy Fax:    Start Date: 6/27/2023

## 2023-06-27 NOTE — TELEPHONE ENCOUNTER
Prior Authorization Approval    Authorization Effective Date: 6/27/2023  Authorization Expiration Date: 6/26/2026  Medication: Tretinoin 0.025% cream  Approved Dose/Quantity:    Reference #:     Insurance Company: [employer] - Phone [phone] Fax [phone]  Expected CoPay:       CoPay Card Available:      Foundation Assistance Needed:    Which Pharmacy is filling the prescription (Not needed for infusion/clinic administered): [id number] - [place], [place] - [address] [facility]  Pharmacy Notified: Yes  Patient Notified: Yes  **Instructed pharmacy to notify patient when script is ready to /ship.**

## 2024-01-10 ENCOUNTER — APPOINTMENT (RX ONLY)
Dept: URBAN - NONMETROPOLITAN AREA CLINIC 43 | Facility: CLINIC | Age: 64
Setting detail: DERMATOLOGY
End: 2024-01-10

## 2024-01-10 DIAGNOSIS — L30.9 DERMATITIS, UNSPECIFIED: ICD-10-CM

## 2024-01-10 DIAGNOSIS — I87.2 VENOUS INSUFFICIENCY (CHRONIC) (PERIPHERAL): ICD-10-CM | Status: INADEQUATELY CONTROLLED

## 2024-01-10 PROCEDURE — ? COUNSELING

## 2024-01-10 PROCEDURE — ? PRESCRIPTION MEDICATION MANAGEMENT

## 2024-01-10 PROCEDURE — 99204 OFFICE O/P NEW MOD 45 MIN: CPT

## 2024-01-10 PROCEDURE — ? PRESCRIPTION

## 2024-01-10 RX ORDER — MUPIROCIN 20 MG/G
OINTMENT TOPICAL
Qty: 22 | Refills: 0 | Status: ERX | COMMUNITY
Start: 2024-01-10

## 2024-01-10 RX ORDER — CLOBETASOL PROPIONATE 0.5 MG/G
CREAM TOPICAL
Qty: 60 | Refills: 0 | Status: ERX | COMMUNITY
Start: 2024-01-10

## 2024-01-10 RX ADMIN — CLOBETASOL PROPIONATE: 0.5 CREAM TOPICAL at 00:00

## 2024-01-10 RX ADMIN — MUPIROCIN: 20 OINTMENT TOPICAL at 00:00

## 2024-01-10 ASSESSMENT — LOCATION DETAILED DESCRIPTION DERM
LOCATION DETAILED: LEFT DISTAL PRETIBIAL REGION
LOCATION DETAILED: RIGHT DISTAL PRETIBIAL REGION
LOCATION DETAILED: RIGHT DISTAL DORSAL FOREARM
LOCATION DETAILED: LEFT DISTAL DORSAL FOREARM

## 2024-01-10 ASSESSMENT — LOCATION ZONE DERM
LOCATION ZONE: ARM
LOCATION ZONE: LEG

## 2024-01-10 ASSESSMENT — LOCATION SIMPLE DESCRIPTION DERM
LOCATION SIMPLE: RIGHT FOREARM
LOCATION SIMPLE: LEFT PRETIBIAL REGION
LOCATION SIMPLE: LEFT FOREARM
LOCATION SIMPLE: RIGHT PRETIBIAL REGION

## 2024-01-10 NOTE — PROCEDURE: PRESCRIPTION MEDICATION MANAGEMENT
Render In Strict Bullet Format?: No
Continue Regimen: Doxycycline (patient has at home)
Initiate Treatment: clobetasol 0.05 % topical cream \\nQuantity: 60.0 g  Days Supply: 30\\nSig: Apply TP BID to affected areas on lower legs for two weeks then only use as needed for itching and irritation\\n\\nmupirocin 2 % topical ointment \\nQuantity: 22.0 g  Days Supply: 30\\nSig: Apply Topically to affected areas twice daily as needed
Detail Level: Zone
Initiate Treatment: Clobetasol. Apply topically twice a day for 2 weeks.

## 2024-04-06 ENCOUNTER — HEALTH MAINTENANCE LETTER (OUTPATIENT)
Age: 64
End: 2024-04-06

## 2024-06-21 DIAGNOSIS — L71.9 ACNE ROSACEA: ICD-10-CM

## 2024-06-24 RX ORDER — DOXYCYCLINE 100 MG/1
100 CAPSULE ORAL 2 TIMES DAILY
Qty: 180 CAPSULE | Refills: 0 | Status: SHIPPED | OUTPATIENT
Start: 2024-06-24 | End: 2024-08-27

## 2024-06-24 NOTE — TELEPHONE ENCOUNTER
Patient Contact    Attempt # 1    Was call answered?  No.  Left message on voicemail with information to call nurse back at [phone].    Pt needs to schedule an appt in 1-3 months for rosacea follow up.     Nimisha SWARTZ RN  [facility] [place]  [phone]

## 2024-06-24 NOTE — TELEPHONE ENCOUNTER
Doxycycline monohydrate 100 mg  Last Written Prescription Date:  6/20/23  Last Fill Quantity: 180,  # refills: 3   Last office visit: 6/20/2023 ; last virtual visit: Visit date not found with prescribing provider:  Freddy   Future Office Visit:  none    Routing refill request to provider for review/approval because:  Drug not on the [facility] refill protocol     Nimisha SWARTZ RN  [facility] [place]  [phone]

## 2024-06-24 NOTE — TELEPHONE ENCOUNTER
Emperatriz Hayes PA-C   to  Skin Nurse Pool  Ec Derm Reception         6/24/24  9:10 AM  Please schedule follow up for further refills in the next 1-3 months.

## 2024-08-27 ENCOUNTER — OFFICE VISIT (OUTPATIENT)
Dept: DERMATOLOGY | Facility: CLINIC | Age: 64
End: 2024-08-27
Payer: COMMERCIAL

## 2024-08-27 DIAGNOSIS — L71.9 ACNE ROSACEA: ICD-10-CM

## 2024-08-27 DIAGNOSIS — L71.9 ROSACEA: Primary | ICD-10-CM

## 2024-08-27 PROCEDURE — 99213 OFFICE O/P EST LOW 20 MIN: CPT | Performed by: PHYSICIAN ASSISTANT

## 2024-08-27 RX ORDER — TRETINOIN 0.25 MG/G
CREAM TOPICAL
Qty: 45 G | Refills: 3 | Status: SHIPPED | OUTPATIENT
Start: 2024-08-27

## 2024-08-27 RX ORDER — DOXYCYCLINE 100 MG/1
100 CAPSULE ORAL 2 TIMES DAILY
Qty: 180 CAPSULE | Refills: 3 | Status: SHIPPED | OUTPATIENT
Start: 2024-08-27

## 2024-08-27 NOTE — PROGRESS NOTES
[facility] Dermatology Note  Encounter Date: Aug 27, 2024  Office Visit     Dermatology Problem List:  1. Acne Rosacea  - current tx: doxycyline 100 mg, Tretinoin 0.025% cream  - s/p sulfacetamide sodium sulfur 10-5% emulsion lotion BID  - s/p cephalexin to 500 mg 1-2 times daily  - sulfacetamide sodium 10% lotion BID  - Skin care w/ gentle cleanser daily  2. Seborrheic dermatitis  ____________________________________________    Assessment & Plan:    1. Acne Rosacea, chronic and fairly well controlled.   - decrease doxycyline 100 mg to once daily. Discussed possible GI upset and sun sensitivity. Reduce to once daily once under control. Return to BID when flaring.  - Continue antibiotic soap.   - continue tretinoin 0.025% cream to face. Instructed to apply topical acne medication once every other day and increase to nightly as tolerate.  Waiting 20-30 minutes after washing affected area(s) will decrease irritation. Method of application, side effects and expected results were discussed. The patient will apply pea size amount to the entire face, avoid areas around the eyes, corners of nose and mouth.  - Discussed side effects including photosensitivity and irritation. Refills provided today.   - Recommend f/u if flaring     2. Hx of seb derm,  Not active. Will monitor.     Procedures Performed:   None    Follow-up: 1 year(s) in-person, or earlier for new or changing lesions/ rosacea flaring.     Staff and Scribe:  Scribe Disclosure:   By signing my name below, I, Lisa Benavides, attest that this documentation has been prepared under the direction and in the presence of Emperatriz Hayes PA-C.  - Electronically Signed: Lisa Benavides 08/27/24       Provider Disclosure:  I agree with above History, Review of Systems, Physical exam and Plan.  I have reviewed the content of the documentation and have edited it as needed. I have personally performed the services documented here and the documentation  accurately represents those services and the decisions I have made.      Electronically signed by:  All risks, benefits and alternatives were discussed with patient.  Patient is in agreement and understands the assessment and plan.  All questions were answered.  Sun Screen Education was given.   Return to Clinic annually or sooner as needed.   Emperatriz Hayes PA-C        ____________________________________________    CC: Rosacea (Follow up rosacea, needs refill)    HPI:  Mr. Nathen Glass is a(n) 63 year old male who presents today as a return patient for follow up on rosacea.    Patient denies diarrhea, bloating. Patient reports minor sun sensitivity. Denies any rosacea on chest or back. He has not skipped any doses of his doxycycline and is interested in decreasing. He still has tretinoin on hand.    Patient is otherwise feeling well, without additional skin concerns.    Labs Reviewed:  N/A    Physical exam:  Vitals: There were no vitals taken for this visit.  GEN: This is a well developed, well-nourished male in no acute distress, in a pleasant mood.    SKIN: Focused examination of the face was performed.  - mild erythema on central face without papules or pustules  - acne scarring noted on nose and medial cheeks  - No other lesions of concern on areas examined.       Medications:  Current Outpatient Medications   Medication Sig Dispense Refill    cephALEXin (KEFLEX) 500 MG capsule Take 1 capsule (500 mg) by mouth 2 times daily 180 capsule 1    cetirizine (ZYRTEC) 10 MG tablet Take 1 tablet (10 mg) by mouth daily 30 tablet 0    doxycycline monohydrate (MONODOX) 100 MG capsule Take 1 capsule (100 mg) by mouth 2 times daily Please schedule follow up for further refills. 180 capsule 0    latanoprost (XALATAN) 0.005 % ophthalmic solution       lisinopril (PRINIVIL,ZESTRIL) 40 MG tablet Take 40 mg by mouth daily.      sertraline (ZOLOFT) 100 MG tablet Take 1 tablet (100 mg) by mouth once daily.      Simvastatin  (ZOCOR PO) Take 1 tablet by mouth daily.      tretinoin (RETIN-A) 0.025 % external cream Use every other night to the face until tolerating well ,then increase to nightly 45 g 3     No current facility-administered medications for this visit.      Past Medical History:   Patient Active Problem List   Diagnosis    Acne rosacea     No past medical history on file.     CC Referred Self, MD  No address on file on close of this encounter.

## 2024-08-27 NOTE — LETTER
8/27/2024      Nathen Glass  [address]  [facility] [address]      Dear Colleague,    Thank you for referring your patient, Nathen Glass, to the [facility]. Please see a copy of my visit note below.     [facility] Dermatology Note  Encounter Date: Aug 27, 2024  Office Visit     Dermatology Problem List:  1. Acne Rosacea  - current tx: doxycyline 100 mg, Tretinoin 0.025% cream  - s/p sulfacetamide sodium sulfur 10-5% emulsion lotion BID  - s/p cephalexin to 500 mg 1-2 times daily  - sulfacetamide sodium 10% lotion BID  - Skin care w/ gentle cleanser daily  2. Seborrheic dermatitis  ____________________________________________    Assessment & Plan:    1. Acne Rosacea, chronic and fairly well controlled.   - decrease doxycyline 100 mg to once daily. Discussed possible GI upset and sun sensitivity. Reduce to once daily once under control. Return to BID when flaring.  - Continue antibiotic soap.   - continue tretinoin 0.025% cream to face. Instructed to apply topical acne medication once every other day and increase to nightly as tolerate.  Waiting 20-30 minutes after washing affected area(s) will decrease irritation. Method of application, side effects and expected results were discussed. The patient will apply pea size amount to the entire face, avoid areas around the eyes, corners of nose and mouth.  - Discussed side effects including photosensitivity and irritation. Refills provided today.   - Recommend f/u if flaring     2. Hx of seb derm,  Not active. Will monitor.     Procedures Performed:   None    Follow-up: 1 year(s) in-person, or earlier for new or changing lesions/ rosacea flaring.     Staff and Scribe:  Scribe Disclosure:   By signing my name below, I, Lisa Benavides, attest that this documentation has been prepared under the direction and in the presence of Emperatriz Hayes PA-C.  - Electronically Signed: Lisa Benavides 08/27/24       Provider  Disclosure:  I agree with above History, Review of Systems, Physical exam and Plan.  I have reviewed the content of the documentation and have edited it as needed. I have personally performed the services documented here and the documentation accurately represents those services and the decisions I have made.      Electronically signed by:  All risks, benefits and alternatives were discussed with patient.  Patient is in agreement and understands the assessment and plan.  All questions were answered.  Sun Screen Education was given.   Return to Clinic annually or sooner as needed.   Emperatriz Hayes PA-C        ____________________________________________    CC: Rosacea (Follow up rosacea, needs refill)    HPI:  Mr. Nathen Glass is a(n) 63 year old male who presents today as a return patient for follow up on rosacea.    Patient denies diarrhea, bloating. Patient reports minor sun sensitivity. Denies any rosacea on chest or back. He has not skipped any doses of his doxycycline and is interested in decreasing. He still has tretinoin on hand.    Patient is otherwise feeling well, without additional skin concerns.    Labs Reviewed:  N/A    Physical exam:  Vitals: There were no vitals taken for this visit.  GEN: This is a well developed, well-nourished male in no acute distress, in a pleasant mood.    SKIN: Focused examination of the face was performed.  - mild erythema on central face without papules or pustules  - acne scarring noted on nose and medial cheeks  - No other lesions of concern on areas examined.       Medications:  Current Outpatient Medications   Medication Sig Dispense Refill     cephALEXin (KEFLEX) 500 MG capsule Take 1 capsule (500 mg) by mouth 2 times daily 180 capsule 1     cetirizine (ZYRTEC) 10 MG tablet Take 1 tablet (10 mg) by mouth daily 30 tablet 0     doxycycline monohydrate (MONODOX) 100 MG capsule Take 1 capsule (100 mg) by mouth 2 times daily Please schedule follow up for further refills.  180 capsule 0     latanoprost (XALATAN) 0.005 % ophthalmic solution        lisinopril (PRINIVIL,ZESTRIL) 40 MG tablet Take 40 mg by mouth daily.       sertraline (ZOLOFT) 100 MG tablet Take 1 tablet (100 mg) by mouth once daily.       Simvastatin (ZOCOR PO) Take 1 tablet by mouth daily.       tretinoin (RETIN-A) 0.025 % external cream Use every other night to the face until tolerating well ,then increase to nightly 45 g 3     No current facility-administered medications for this visit.      Past Medical History:   Patient Active Problem List   Diagnosis     Acne rosacea     No past medical history on file.     CC Referred Self, MD  No address on file on close of this encounter.      Again, thank you for allowing me to participate in the care of your patient.        Sincerely,        Emperatriz Hayes PA-C

## 2024-08-27 NOTE — PATIENT INSTRUCTIONS
Proper skin care from [facility]:    -Eliminate harsh soaps as they strip the natural oils from the skin, often resulting in dry itchy skin ( i.e. Dial, Zest, Sloven[place] Spring)  -Use mild soaps such as Cetaphil or Dove Sensitive Skin in the shower. You do not need to use soap on arms, legs, and trunk every time you shower unless visibly soiled.   -Avoid hot or cold showers.  -After showering, lightly dry off and apply moisturizing within 2-3 minutes. This will help trap moisture in the skin.   -Aggressive use of a moisturizer at least 1-2 times a day to the entire body (including -Vanicream, Cetaphil, Aquaphor or Cerave) and moisturize hands after every washing.  -We recommend using moisturizers that come in a tub that needs to be scooped out, not a pump. This has more of an oil base. It will hold moisture in your skin much better than a water base moisturizer. The above recommended are non-pore clogging.      Wear a sunscreen with at least SPF 30 on your face, ears, neck and V of the chest daily. Wear sunscreen on other areas of the body if those areas are exposed to the sun throughout the day. Sunscreens can contain physical and/or chemical blockers. Physical blockers are less likely to clog pores, these include zinc oxide and titanium dioxide. Reapply every two hour and after swimming.     Sunscreen examples: https://www.ewg.org/sunscreen/    UV radiation  UVA radiation remains constant throughout the day and throughout the year. It is a longer wavelength than UVB and therefore penetrates deeper into the skin leading to immediate and delayed tanning, photoaging, and skin cancer. 70-80% of UVA and UVB radiation occurs between the hours of 10am-2pm.  UVB radiation  UVB radiation causes the most harmful effects and is more significant during the summer months. However, snow and ice can reflect UVB radiation leading to skin damage during the winter months as well. UVB radiation is responsible for tanning,  burning, inflammation, delayed erythema (pinkness), pigmentation (brown spots), and skin cancer.     I recommend self monthly full body exams and yearly full body exams with a dermatology provider. If you develop a new or changing lesion please follow up for examination. Most skin cancers are pink and scaly or pink and pearly. However, we do see blue/brown/black skin cancers.  Consider the ABCDEs of melanoma when giving yourself your monthly full body exam ( don't forget the groin, buttocks, feet, toes, etc). A-asymmetry, B-borders, C-color, D-diameter, E-elevation or evolving. If you see any of these changes please follow up in clinic. If you cannot see your back I recommend purchasing a hand held mirror to use with a larger wall mirror.       Checking for Skin Cancer  You can find cancer early by checking your skin each month. There are 3 kinds of skin cancer. They are melanoma, basal cell carcinoma, and squamous cell carcinoma. Doing monthly skin checks is the best way to find new marks or skin changes. Follow the instructions below for checking your skin.   The ABCDEs of checking moles for melanoma   Check your moles or growths for signs of melanoma using ABCDE:   Asymmetry: the sides of the mole or growth don t match  Border: the edges are ragged, notched, or blurred  Color: the color within the mole or growth varies  Diameter: the mole or growth is larger than 6 mm (size of a pencil eraser)  Evolving: the size, shape, or color of the mole or growth is changing (evolving is not shown in the images below)    Checking for other types of skin cancer  Basal cell carcinoma or squamous cell carcinoma have symptoms such as:     A spot or mole that looks different from all other marks on your skin  Changes in how an area feels, such as itching, tenderness, or pain  Changes in the skin's surface, such as oozing, bleeding, or scaliness  A sore that does not heal  New swelling or redness beyond the border of a  mole    Who s at risk?  Anyone can get skin cancer. But you are at greater risk if you have:   Fair skin, light-colored hair, or light-colored eyes  Many moles or abnormal moles on your skin  A history of sunburns from sunlight or tanning beds  A family history of skin cancer  A history of exposure to radiation or chemicals  A weakened immune system  If you have had skin cancer in the past, you are at risk for recurring skin cancer.   How to check your skin  Do your monthly skin checkups in front of a full-length mirror. Check all parts of your body, including your:   Head (ears, face, neck, and scalp)  Torso (front, back, and sides)  Arms (tops, undersides, upper, and lower armpits)  Hands (palms, backs, and fingers, including under the nails)  Buttocks and genitals  Legs (front, back, and sides)  Feet (tops, soles, toes, including under the nails, and between toes)  If you have a lot of moles, take digital photos of them each month. Make sure to take photos both up close and from a distance. These can help you see if any moles change over time.   Most skin changes are not cancer. But if you see any changes in your skin, call your doctor right away. Only he or she can diagnose a problem. If you have skin cancer, seeing your doctor can be the first step toward getting the treatment that could save your life.   [employer] last reviewed this educational content on 4/1/2019 2000-2020 [employer]. [address] [place] [address], [place] [address]. All rights reserved. This information is not intended as a substitute for professional medical care. Always follow your healthcare professional's instructions.       When should I call my doctor?  If you are worsening or not improving, please, contact us or seek urgent care as noted below.     Who should I call with questions (adults)?    [facility] [phone]  For urgent needs outside of business hours call the [facility] at  [phone] and ask for the dermatology resident on call to be paged  If this is a medical emergency and you are unable to reach an ER, Call 911      If you need a prescription refill, please contact your pharmacy. Refills are approved or denied by our Physicians during normal business hours, Monday through Fridays  Per office policy, refills will not be granted if you have not been seen within the past year (or sooner depending on your child's condition)

## 2025-02-06 ENCOUNTER — TELEPHONE (OUTPATIENT)
Dept: DERMATOLOGY | Facility: CLINIC | Age: 65
End: 2025-02-06
Payer: COMMERCIAL

## 2025-02-06 DIAGNOSIS — L71.9 ACNE ROSACEA: ICD-10-CM

## 2025-02-06 RX ORDER — TRETINOIN 0.25 MG/G
CREAM TOPICAL
Qty: 45 G | Refills: 2 | Status: SHIPPED | OUTPATIENT
Start: 2025-02-06

## 2025-02-06 NOTE — TELEPHONE ENCOUNTER
Rx for tretinoin cream sent to [facility] in [facility] pharmacy.    Nimisha SWARTZ RN  [facility] [place]  [phone]

## 2025-02-06 NOTE — TELEPHONE ENCOUNTER
[facility] Call Center    Phone Message    May a detailed message be left on voicemail: yes     Reason for Call: Medication Refill Request    Has the patient contacted the pharmacy for the refill? Yes   Name of medication being requested: tretinoin (RETIN-A) 0.025 % external cream   Provider who prescribed the medication: Emperatriz Hayes PA-C  Pharmacy: [facility] [address] [facility] P: [phone]  Date medication is needed: Now. Pt states he changed pharmacies and the new pharmacy won't call to request the refill.      Action Taken: Other: EC derm    Travel Screening: Not Applicable     Date of Service:

## 2025-03-23 ENCOUNTER — HEALTH MAINTENANCE LETTER (OUTPATIENT)
Age: 65
End: 2025-03-23

## 2025-08-29 DIAGNOSIS — L71.9 ACNE ROSACEA: ICD-10-CM

## 2025-09-02 RX ORDER — DOXYCYCLINE 100 MG/1
CAPSULE ORAL
Qty: 60 CAPSULE | Refills: 5 | Status: SHIPPED | OUTPATIENT
Start: 2025-09-02